# Patient Record
Sex: MALE | Race: WHITE | URBAN - METROPOLITAN AREA
[De-identification: names, ages, dates, MRNs, and addresses within clinical notes are randomized per-mention and may not be internally consistent; named-entity substitution may affect disease eponyms.]

---

## 2017-01-01 ENCOUNTER — TRANSFERRED RECORDS (OUTPATIENT)
Dept: HEALTH INFORMATION MANAGEMENT | Facility: CLINIC | Age: 82
End: 2017-01-01

## 2017-01-01 ENCOUNTER — PRE VISIT (OUTPATIENT)
Dept: RADIATION ONCOLOGY | Facility: CLINIC | Age: 82
End: 2017-01-01

## 2017-01-01 ENCOUNTER — OFFICE VISIT (OUTPATIENT)
Dept: RADIATION ONCOLOGY | Facility: CLINIC | Age: 82
End: 2017-01-01
Attending: RADIOLOGY
Payer: MEDICARE

## 2017-01-01 ENCOUNTER — HOSPITAL ENCOUNTER (OUTPATIENT)
Dept: MEDSURG UNIT | Facility: CLINIC | Age: 82
End: 2017-08-30
Attending: RADIOLOGY
Payer: MEDICARE

## 2017-01-01 ENCOUNTER — HOSPITAL ENCOUNTER (OUTPATIENT)
Dept: MRI IMAGING | Facility: CLINIC | Age: 82
Discharge: HOME | End: 2017-08-30
Attending: NEUROLOGICAL SURGERY | Admitting: NEUROLOGICAL SURGERY
Payer: MEDICARE

## 2017-01-01 ENCOUNTER — TELEPHONE (OUTPATIENT)
Dept: RADIATION ONCOLOGY | Facility: CLINIC | Age: 82
End: 2017-01-01

## 2017-01-01 VITALS
HEART RATE: 101 BPM | TEMPERATURE: 97.7 F | SYSTOLIC BLOOD PRESSURE: 149 MMHG | RESPIRATION RATE: 16 BRPM | DIASTOLIC BLOOD PRESSURE: 83 MMHG | OXYGEN SATURATION: 98 %

## 2017-01-01 VITALS
HEIGHT: 66 IN | BODY MASS INDEX: 26.2 KG/M2 | WEIGHT: 163 LBS | RESPIRATION RATE: 16 BRPM | DIASTOLIC BLOOD PRESSURE: 79 MMHG | OXYGEN SATURATION: 98 % | HEART RATE: 76 BPM | SYSTOLIC BLOOD PRESSURE: 152 MMHG

## 2017-01-01 VITALS
DIASTOLIC BLOOD PRESSURE: 86 MMHG | SYSTOLIC BLOOD PRESSURE: 117 MMHG | RESPIRATION RATE: 16 BRPM | HEART RATE: 96 BPM | OXYGEN SATURATION: 98 %

## 2017-01-01 DIAGNOSIS — C79.31 METASTASIS TO BRAIN (H): Primary | ICD-10-CM

## 2017-01-01 DIAGNOSIS — C79.31 METASTASIS TO BRAIN (H): ICD-10-CM

## 2017-01-01 DIAGNOSIS — C79.49 SECONDARY MALIGNANT NEOPLASM OF BRAIN AND SPINAL CORD (H): Primary | ICD-10-CM

## 2017-01-01 DIAGNOSIS — C79.31 SECONDARY MALIGNANT NEOPLASM OF BRAIN AND SPINAL CORD (H): Primary | ICD-10-CM

## 2017-01-01 LAB
ANION GAP SERPL CALCULATED.3IONS-SCNC: NORMAL MMOL/L
BASOPHILS NFR BLD AUTO: ABNORMAL %
BUN SERPL-MCNC: NORMAL MG/DL
CALCIUM SERPL-MCNC: NORMAL MG/DL
CHLORIDE SERPLBLD-SCNC: NORMAL MMOL/L
CO2 SERPL-SCNC: NORMAL MMOL/L
CREAT SERPL-MCNC: 0.78 MG/DL
EOSINOPHIL NFR BLD AUTO: ABNORMAL %
ERYTHROCYTE [DISTWIDTH] IN BLOOD BY AUTOMATED COUNT: ABNORMAL %
GFR SERPL CREATININE-BSD FRML MDRD: >60 ML/MIN/1.73M2
GLUCOSE SERPL-MCNC: NORMAL MG/DL (ref 70–99)
HCT VFR BLD AUTO: ABNORMAL %
HEMOGLOBIN: 11 G/DL (ref 13.3–17.7)
INR PPP: 1.2
LYMPHOCYTES NFR BLD AUTO: ABNORMAL %
MCH RBC QN AUTO: ABNORMAL PG
MCHC RBC AUTO-ENTMCNC: ABNORMAL G/DL
MCV RBC AUTO: ABNORMAL FL
MONOCYTES NFR BLD AUTO: ABNORMAL %
NEUTROPHILS NFR BLD AUTO: 92.5 %
PLATELET COUNT - QUEST: 200 10^9/L (ref 150–450)
POTASSIUM SERPL-SCNC: 4.6 MMOL/L
RBC # BLD AUTO: ABNORMAL 10^12/L
SODIUM SERPL-SCNC: 136 MMOL/L
WBC # BLD AUTO: 7 10^9/L

## 2017-01-01 PROCEDURE — 77370 RADIATION PHYSICS CONSULT: CPT | Performed by: RADIOLOGY

## 2017-01-01 PROCEDURE — A9270 NON-COVERED ITEM OR SERVICE: HCPCS | Mod: GY | Performed by: NEUROLOGICAL SURGERY

## 2017-01-01 PROCEDURE — 99215 OFFICE O/P EST HI 40 MIN: CPT | Mod: 25 | Performed by: RADIOLOGY

## 2017-01-01 PROCEDURE — 25000128 H RX IP 250 OP 636: Performed by: NEUROLOGICAL SURGERY

## 2017-01-01 PROCEDURE — 25000128 H RX IP 250 OP 636: Mod: ZF | Performed by: NEUROLOGICAL SURGERY

## 2017-01-01 PROCEDURE — 77371 SRS MULTISOURCE: CPT | Performed by: RADIOLOGY

## 2017-01-01 PROCEDURE — 77300 RADIATION THERAPY DOSE PLAN: CPT | Performed by: RADIOLOGY

## 2017-01-01 PROCEDURE — A9585 GADOBUTROL INJECTION: HCPCS | Performed by: NEUROLOGICAL SURGERY

## 2017-01-01 PROCEDURE — 25000131 ZZH RX MED GY IP 250 OP 636 PS 637: Mod: ZF | Performed by: NEUROLOGICAL SURGERY

## 2017-01-01 PROCEDURE — 77295 3-D RADIOTHERAPY PLAN: CPT | Performed by: RADIOLOGY

## 2017-01-01 PROCEDURE — 70552 MRI BRAIN STEM W/DYE: CPT

## 2017-01-01 PROCEDURE — 40000172 ZZH STATISTIC PROCEDURE PREP ONLY

## 2017-01-01 PROCEDURE — 25000132 ZZH RX MED GY IP 250 OP 250 PS 637: Mod: GY | Performed by: NEUROLOGICAL SURGERY

## 2017-01-01 PROCEDURE — 77334 RADIATION TREATMENT AID(S): CPT | Performed by: RADIOLOGY

## 2017-01-01 PROCEDURE — 25000125 ZZHC RX 250: Mod: ZF | Performed by: NEUROLOGICAL SURGERY

## 2017-01-01 RX ORDER — ONDANSETRON 2 MG/ML
4 INJECTION INTRAMUSCULAR; INTRAVENOUS
Status: DISCONTINUED | OUTPATIENT
Start: 2017-01-01 | End: 2017-01-01 | Stop reason: HOSPADM

## 2017-01-01 RX ORDER — LIDOCAINE 40 MG/G
1 CREAM TOPICAL SEE ADMIN INSTRUCTIONS
Status: COMPLETED | OUTPATIENT
Start: 2017-01-01 | End: 2017-01-01

## 2017-01-01 RX ORDER — HYDROCODONE BITARTRATE AND ACETAMINOPHEN 5; 325 MG/1; MG/1
1-2 TABLET ORAL EVERY 6 HOURS PRN
Status: DISCONTINUED | OUTPATIENT
Start: 2017-01-01 | End: 2017-01-01 | Stop reason: HOSPADM

## 2017-01-01 RX ORDER — LORAZEPAM 0.5 MG/1
.5-1 TABLET ORAL
Status: DISCONTINUED | OUTPATIENT
Start: 2017-01-01 | End: 2017-01-01 | Stop reason: HOSPADM

## 2017-01-01 RX ORDER — LORATADINE 10 MG/1
10 TABLET ORAL DAILY
COMMUNITY

## 2017-01-01 RX ORDER — LORAZEPAM 0.5 MG/1
.5-2 TABLET ORAL
Status: COMPLETED | OUTPATIENT
Start: 2017-01-01 | End: 2017-01-01

## 2017-01-01 RX ORDER — DEXAMETHASONE 4 MG/1
4 TABLET ORAL
Status: COMPLETED | OUTPATIENT
Start: 2017-01-01 | End: 2017-01-01

## 2017-01-01 RX ORDER — ACETAMINOPHEN 325 MG/1
650 TABLET ORAL EVERY 4 HOURS PRN
Status: DISCONTINUED | OUTPATIENT
Start: 2017-01-01 | End: 2017-01-01 | Stop reason: HOSPADM

## 2017-01-01 RX ORDER — GADOBUTROL 604.72 MG/ML
7.5 INJECTION INTRAVENOUS ONCE
Status: COMPLETED | OUTPATIENT
Start: 2017-01-01 | End: 2017-01-01

## 2017-01-01 RX ORDER — ONDANSETRON 4 MG/1
8 TABLET, ORALLY DISINTEGRATING ORAL EVERY 6 HOURS PRN
Status: DISCONTINUED | OUTPATIENT
Start: 2017-01-01 | End: 2017-01-01 | Stop reason: HOSPADM

## 2017-01-01 RX ORDER — HEPARIN SODIUM (PORCINE) LOCK FLUSH IV SOLN 100 UNIT/ML 100 UNIT/ML
500 SOLUTION INTRAVENOUS SEE ADMIN INSTRUCTIONS
Status: COMPLETED | OUTPATIENT
Start: 2017-01-01 | End: 2017-01-01

## 2017-01-01 RX ORDER — HEPARIN SODIUM,PORCINE 10 UNIT/ML
5 VIAL (ML) INTRAVENOUS
Status: COMPLETED | OUTPATIENT
Start: 2017-01-01 | End: 2017-01-01

## 2017-01-01 RX ADMIN — DEXAMETHASONE 4 MG: 4 TABLET ORAL at 11:13

## 2017-01-01 RX ADMIN — LORAZEPAM 1 MG: 0.5 TABLET ORAL at 06:20

## 2017-01-01 RX ADMIN — LIDOCAINE 1 G: 40 CREAM TOPICAL at 06:20

## 2017-01-01 RX ADMIN — LIDOCAINE HYDROCHLORIDE,EPINEPHRINE BITARTRATE 30 ML: 20; .01 INJECTION, SOLUTION INFILTRATION; PERINEURAL at 06:19

## 2017-01-01 RX ADMIN — GADOBUTROL 7.5 ML: 604.72 INJECTION INTRAVENOUS at 07:58

## 2017-01-01 RX ADMIN — SODIUM CHLORIDE, PRESERVATIVE FREE 500 UNITS: 5 INJECTION INTRAVENOUS at 11:14

## 2017-01-01 RX ADMIN — SODIUM CHLORIDE, PRESERVATIVE FREE 5 ML: 5 INJECTION INTRAVENOUS at 06:20

## 2017-01-01 RX ADMIN — SODIUM CHLORIDE, PRESERVATIVE FREE 5 ML: 5 INJECTION INTRAVENOUS at 07:50

## 2017-01-01 ASSESSMENT — ENCOUNTER SYMPTOMS
CONSTIPATION: 0
HEADACHES: 1
PHOTOPHOBIA: 0
BLURRED VISION: 1
DOUBLE VISION: 0
NAUSEA: 0
COUGH: 1
DIARRHEA: 1
SPUTUM PRODUCTION: 1
FEVER: 0
DEPRESSION: 0
VOMITING: 0
SHORTNESS OF BREATH: 1
WEIGHT LOSS: 0
MUSCULOSKELETAL NEGATIVE: 1
SEIZURES: 1

## 2017-07-07 ENCOUNTER — HOSPITAL ENCOUNTER (OUTPATIENT)
Age: 82
Discharge: HOME | End: 2017-07-07
Payer: MEDICARE

## 2017-07-07 LAB
INR BLD: 1.7
PROTHROMBIN TIME: 17.5 SEC (ref 9.7–11.6)

## 2017-07-07 PROCEDURE — 36415 COLL VENOUS BLD VENIPUNCTURE: CPT

## 2017-07-07 PROCEDURE — 85610 PROTHROMBIN TIME: CPT

## 2017-08-14 NOTE — TELEPHONE ENCOUNTER
"Date: 2017   Age: 82 year old  Ethnicity:    Sex: male  : 1935   Lives In: [place], [place]      Diagnosis: Esophageal Cancer    Prior radiation therapy:   Site Treated: esophageal tumor and adjacent regional lymph nodes of mediastinum and upper abd  Facility: [facility] by Dr. Bailon  Dates: 16 thru 16  Dose: 5040 cGy in 28 fractions    Site Treated: at  Facility: at  Dates: at  Dose: at        Prior chemotherapy: See Below      Pain at time of consult, management plan if yes:  Does pt have a living will:  Fall Risk, if yes what precautions taking:  Is Pt Pregnant:  Does Pt have any implanted cardiac devices:    Doctors to \"cc\":  Dr. Abimael Bailon, Dr. Irvin Carson    Pt was educated on Gamma Knife Procedure     RN time with patient      Review Since Diagnosis:    Symptoms    6654-3724; started having lower esophageal mucosal biopsies, each showing Hector's esophagus    ; started having dysphagia, poor appetite, generalized weakness and wt loss    16; CT ABd/Pelvis, abd aortic aneurysm, hiatal hernia and two slightly prominent nodes near hiatal hernia    16; biopsy of esophagus, showed moderate poorly differentiated invasive adenocarcinoma     16; abd aortic aneurysm repair     16; celiac lymph node and perigastric lymph node biopsy showed metastatic adenocarcinoma consistent with esophageal primary    16; PET, 10 cm lesion mid to distal esophagus, one metabolically active lymph node upper abdomen, no distant mets     16; pt saw Dr. Bailon, discussed radiation    16 thru 16; chemo of Carboplatin and Paclitaxel by Dr. Carson    16 thru 16; radiation to esophageal tumor and adjacent regional lymph nodes of mediastinum and upper abd    Did not get complete response    17; robotic transhiatal esophagectomy, pull through neck anastomosis     ; did not show relapsed disease     17; to [facility] with nausea and vomiting and diarrhea, changes " in mentation, gets feedings thru j tube     8/9/17; CT Head non contrast, mass effect right frontal and left parietal, recommend MRI     8/9/17; Brain MRI, 2.1 x 2 x 2.3 cm lesion right frontal                                  1.3 x 2 x 1.9 cm lesion left inferior parietal     8/9/17; saw in house Med/Onc CNP, refer to rad/onc    8/11/17; Dr. Bailon saw pt in house,  Notes pt on dexamethasone and symptoms improved, refer to Dr. Salas for GK vs whole brain    Chief Complaint: at consult

## 2017-08-16 NOTE — PROGRESS NOTES
"  HPI    Date: 2017   Age: 82 year old  Ethnicity:    Sex: male  : 1935   Lives In: [place], [place]      Diagnosis: Esophageal Cancer    Prior radiation therapy:   Site Treated: esophageal tumor and adjacent regional lymph nodes of mediastinum and upper abd  Facility: [facility] by Dr. Bailon  Dates: 16 thru 16  Dose: 5040 cGy in 28 fractions        Prior chemotherapy: See Below      Pain at time of consult, management plan if yes: pt denies pain at time of consult  Does pt have a living will:  Pt has a living  Fall Risk, if yes what precautions taking: pt has not been falling  Is Pt Pregnant: N/A  Does Pt have any implanted cardiac devices: pt has no implanted cardiac devices    Doctors to \"cc\":  Dr. Abimael Bailon, Dr. Irvin Carson, Dr. Beto Walter-family doctor    Pt was educated on Gamma Knife Procedure ;yes    RN time with patient 55 minutes    Review Since Diagnosis:    Extremely bad breath    [id number]; started having lower esophageal mucosal biopsies, each showing Hector's esophagus    ; started having dysphagia, poor appetite, generalized weakness and wt loss    16; CT ABd/Pelvis, abd aortic aneurysm, hiatal hernia and two slightly prominent nodes near hiatal hernia    16; biopsy of esophagus, showed moderate poorly differentiated invasive adenocarcinoma     16; abd aortic aneurysm repair     16; celiac lymph node and perigastric lymph node biopsy showed metastatic adenocarcinoma consistent with esophageal primary    16; PET, 10 cm lesion mid to distal esophagus, one metabolically active lymph node upper abdomen, no distant mets     16; pt saw Dr. Bailon, discussed radiation    16 thru 16; chemo of Carboplatin and Paclitaxel by Dr. Carson    16 thru 16; radiation to esophageal tumor and adjacent regional lymph nodes of mediastinum and upper abd    Did not get complete response    17; robotic transhiatal esophagectomy, pull " through neck anastomosis     5/17; did not show relapsed disease     8/9/17; to [facility] with nausea and vomiting and diarrhea, changes in mentation, gets feedings thru j tube     8/9/17; CT Head non contrast, mass effect right frontal and left parietal, recommend MRI     8/9/17; Brain MRI, 2.1 x 2 x 2.3 cm lesion right frontal                                  1.3 x 2 x 1.9 cm lesion left inferior parietal     8/9/17; saw in house Med/Onc CNP, refer to rad/onc    8/11/17; Dr. Bailon saw pt in house,  Notes pt on dexamethasone and symptoms improved, refer to Dr. Salas for GK vs whole brain    Chief Complaint: since dexamethasone mentation has really improved, pt notes it was so bad that he didn't know how to respond to things, things just didn't register, nausea and vomiting improved, pt still feels weak        Review of Systems   Constitutional: Positive for malaise/fatigue. Negative for fever and weight loss.   HENT:        Pt notes rare dull headaches, does not need meds for   Eyes: Positive for blurred vision. Negative for double vision and photophobia.   Respiratory: Positive for cough, sputum production and shortness of breath.    Cardiovascular: Negative for chest pain and leg swelling.   Gastrointestinal: Positive for diarrhea. Negative for constipation, nausea and vomiting.   Genitourinary: Negative.    Musculoskeletal: Negative.    Neurological: Positive for seizures and headaches.   Psychiatric/Behavioral: Negative for depression.

## 2017-08-16 NOTE — MR AVS SNAPSHOT
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)    -  1       Follow-ups after your visit        Your next 10 appointments already scheduled     Aug 30, 2017  6:00 AM CDT   GAMMA TREATMENT with Jeronimo Salas MD   [facility] ([facility]    [address] [address]   [phone]            Aug 30, 2017  8:00 AM CDT   MR BRAIN W CONTRAST with UUMR1   [facility] [place], MRI ([facility])    [address] [address]   [phone]           Take your medicines as usual, unless your doctor tells you not to. Bring a list of your current medicines to your exam (including vitamins, minerals and over-the-counter drugs).  You will be given intravenous contrast for this exam. To prepare:   The day before your exam, drink extra fluids at least six 8-ounce glasses (unless your doctor tells you to restrict your fluids).   Have a blood test (creatinine test) within 30 days of your exam. Go to your clinic or Diagnostic Imaging Department for this test.  The MRI machine uses a strong magnet. Please wear clothes without metal (snaps, zippers). A sweatsuit works well, or we may give you a hospital gown.  Please remove any body piercings and hair extensions before you arrive. You will also remove watches, jewelry, hairpins, wallets, dentures, partial dental plates and hearing aids. You may wear contact lenses, and you may be able to wear your rings. We have a safe place to keep your personal items, but it is safer to leave them at home.   **IMPORTANT** THE INSTRUCTIONS BELOW ARE ONLY FOR THOSE  "PATIENTS WHO HAVE BEEN TOLD THEY WILL RECEIVE SEDATION OR GENERAL ANESTHESIA DURING THEIR MRI PROCEDURE:  IF YOU WILL RECEIVE SEDATION (take medicine to help you relax during your exam):   You must get the medicine from your doctor before you arrive. Bring the medicine to the exam. Do not take it at home.   Arrive one hour early. Bring someone who can take you home after the test. Your medicine will make you sleepy. After the exam, you may not drive, take a bus or take a taxi by yourself.   No eating 8 hours before your exam. You may have clear liquids up until 4 hours before your exam. (Clear liquids include water, clear tea, black coffee and fruit juice without pulp.)  IF YOU WILL RECEIVE ANESTHESIA (be asleep for your exam):   Arrive 1 1/2 hours early. Bring someone who can take you home after the test. You may not drive, take a bus or take a taxi by yourself.   No eating 8 hours before your exam. You may have clear liquids up until 4 hours before your exam. (Clear liquids include water, clear tea, black coffee and fruit juice without pulp.)  Please call the Imaging Department at your exam site with any questions.              Who to contact     Please call your clinic at [phone] to:    Ask questions about your health    Make or cancel appointments    Discuss your medicines    Learn about your test results    Speak to your doctor   If you have compliments or concerns about an experience at your clinic, or if you wish to file a complaint, please contact [facility] Patient Relations at [phone] or email us at [email or web address]         Additional Information About Your Visit        Care EveryWhere ID     This is your Care EveryWhere ID. This could be used by other organizations to access your [facility] medical records  [id number]        Your Vitals Were     Pulse Respirations Height Pulse Oximetry BMI (Body Mass Index)       76 16 1.676 m (5' 6\") 98% 26.31 kg/m2     "    Blood Pressure from Last 3 Encounters:   08/16/17 152/79    Weight from Last 3 Encounters:   08/16/17 73.9 kg (163 lb)              Today, you had the following     No orders found for display       Primary Care Provider Office Phone # Fax #    Beto Walter [phone]       [facility] [address]   [facility] [address]        Equal Access to Services     JONNY [facility] Center[facility]ON : Hadii aad ku hadasho Soomaali, waaxda luqadaha, qaybta kaalmada adeegyada, waxay idiin hayaan adeeg kharash la'aan . So [facility] [phone].    ATENCIÓN: Si habla español, tiene a carias disposición servicios gratuitos de asistencia lingüística. Llame al [phone].    We comply with applicable federal civil rights laws and [place] laws. We do not discriminate on the basis of race, color, national origin, age, disability sex, sexual orientation or gender identity.            Thank you!     Thank you for choosing [facility] [place], RADIATION ONCOLOGY  for your care. Our goal is always to provide you with excellent care. Hearing back from our patients is one way we can continue to improve our services. Please take a few minutes to complete the written survey that you may receive in the mail after your visit with us. Thank you!             Your Updated Medication List - Protect others around you: Learn how to safely use, store and throw away your medicines at www.disposemymeds.org.          This list is accurate as of: 8/16/17 11:59 PM.  Always use your most recent med list.                   Brand Name Dispense Instructions for use Diagnosis    ALLOPURINOL PO      Take 300 mg by mouth daily        AMLODIPINE BESYLATE PO      Take 5 mg by mouth daily        ATORVASTATIN CALCIUM PO      Take 20 mg by mouth daily        DEXAMETHASONE PO      Take 6 mg by mouth 3 times daily (with meals)        loratadine 10 MG tablet    CLARITIN     Take 10 mg by mouth daily        METOPROLOL TARTRATE PO      Take 150 mg by  mouth 2 times daily        OMEPRAZOLE PO      Take 40 mg by mouth every morning        WARFARIN SODIUM PO      Take 5 mg by mouth daily 2.5 mg on Friday, 5 mg every other day

## 2017-08-16 NOTE — LETTER
"2017     RE: Adi Curry  [address] [address]     Dear Colleague,    Thank you for referring your patient, Adi Curry, to the [facility] [place], RADIATION ONCOLOGY. Please see a copy of my visit note below.    HPI    Date: 2017   Age: 82 year old  Ethnicity:    Sex: male  : 1935   Lives In: [place], [place]      Diagnosis: Esophageal Cancer    Prior radiation therapy:   Site Treated: esophageal tumor and adjacent regional lymph nodes of mediastinum and upper abd  Facility: [facility] by Dr. Bailon  Dates: 16 thru 16  Dose: 5040 cGy in 28 fractions        Prior chemotherapy: See Below      Pain at time of consult, management plan if yes: pt denies pain at time of consult  Does pt have a living will:  Pt has a living  Fall Risk, if yes what precautions taking: pt has not been falling  Is Pt Pregnant: N/A  Does Pt have any implanted cardiac devices: pt has no implanted cardiac devices    Doctors to \"cc\":  Dr. Abimael Bailon, Dr. Irvin Carson, Dr. Beto Walter-family doctor    Pt was educated on Gamma Knife Procedure ;yes    RN time with patient 55 minutes    Review Since Diagnosis:    Extremely bad breath    7015-8841; started having lower esophageal mucosal biopsies, each showing Hector's esophagus    ; started having dysphagia, poor appetite, generalized weakness and wt loss    16; CT ABd/Pelvis, abd aortic aneurysm, hiatal hernia and two slightly prominent nodes near hiatal hernia    16; biopsy of esophagus, showed moderate poorly differentiated invasive adenocarcinoma     16; abd aortic aneurysm repair     16; celiac lymph node and perigastric lymph node biopsy showed metastatic adenocarcinoma consistent with esophageal primary    16; PET, 10 cm lesion mid to distal esophagus, one metabolically active lymph node upper abdomen, no distant mets     16; pt saw Dr. Bailon, discussed radiation    16 thru 16; chemo of " Carboplatin and Paclitaxel by Dr. Carson    8/22/16 thru 9/29/16; radiation to esophageal tumor and adjacent regional lymph nodes of mediastinum and upper abd    Did not get complete response    1/18/17; robotic transhiatal esophagectomy, pull through neck anastomosis     5/17; did not show relapsed disease     8/9/17; to [facility] with nausea and vomiting and diarrhea, changes in mentation, gets feedings thru j tube     8/9/17; CT Head non contrast, mass effect right frontal and left parietal, recommend MRI     8/9/17; Brain MRI, 2.1 x 2 x 2.3 cm lesion right frontal                                  1.3 x 2 x 1.9 cm lesion left inferior parietal     8/9/17; saw in house Med/Onc CNP, refer to rad/onc    8/11/17; Dr. Bailon saw pt in house,  Notes pt on dexamethasone and symptoms improved, refer to Dr. Salas for GK vs whole brain    Chief Complaint: since dexamethasone mentation has really improved, pt notes it was so bad that he didn't know how to respond to things, things just didn't register, nausea and vomiting improved, pt still feels weak        Review of Systems   Constitutional: Positive for malaise/fatigue. Negative for fever and weight loss.   HENT:        Pt notes rare dull headaches, does not need meds for   Eyes: Positive for blurred vision. Negative for double vision and photophobia.   Respiratory: Positive for cough, sputum production and shortness of breath.    Cardiovascular: Negative for chest pain and leg swelling.   Gastrointestinal: Positive for diarrhea. Negative for constipation, nausea and vomiting.   Genitourinary: Negative.    Musculoskeletal: Negative.    Neurological: Positive for seizures and headaches.   Psychiatric/Behavioral: Negative for depression.                 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      HISTORY OF PRESENT ILLNESS:  The patient is an 82-year-old white male, nonsmoker, nondrinker who was in stable health until 2007 when he  "developed gastroesophageal reflux which was attributed to a hiatal hernia.  He was treated with H2 blockers.  He underwent multiple biopsies of the lower esophagus every 1-3 years, which were consistent with Hector's esophagus.  In 12/2015, he developed extremely \"bad breath.\"  He was treated with Pepcid without benefit.  He was evaluated by his dentist who found no cause for the bed breath.  Endoscopy was recommended.  By early 2016, he developed dysphasia, lack of appetite, weight loss and generalized weakness.  CT scans 06/13/2016 revealed an abdominal aortic aneurysm, a hiatal hernia and a lower esophageal mass with paraesophageal adenopathy.  On 06/27/2016, biopsy of the esophageal mass confirmed moderately to poorly differentiated invasive adenocarcinoma.  On 07/13/2016, he underwent repair of his abdominal aortic aneurysm.  On 07/26/2016, biopsy of the celiac adenopathy confirmed metastatic adenocarcinoma.  PET scan 08/02/2016 showed a 10 cm lesion in the mid to distal esophagus with hypermetabolic adenopathy in the abdomen.  The patient underwent concurrent radiation (50.4 Gy completed 09/29/2016) and chemotherapy (carboplatin and Taxol).  CT scan in November showed improvement, but not a complete response.  He had persistent difficulty swallowing.  On 11/25/2016, a feeding tube was placed.  On 01/18/2017, he underwent a robotic transhiatal esophagectomy.  Pathology revealed persistent adenocarcinoma.  CT scan in 05/2017 was stable.  On 07/28/2017, he developed confusion, nausea, vomiting and word-finding difficulty.  On 08/09/2017, head CT and subsequent brain MRI revealed 2 brain metastases (2.3 cm right frontal and 2 cm left inferior parietal) with significant surrounding vasogenic edema.  He was started on dexamethasone and his symptoms improved.  He was referred for consideration of his treatment options.  His chief complaints now are difficulty with short-term memory and a persistent visual field " deficit (right upper outer quadrant) from a stroke on 04/10/2014.  He denies headaches or word finding difficulty at this time.      PAST MEDICAL HISTORY:   1.  Status post cerebrovascular accident 04/10/2014.   2.  Acute renal failure.   3.  Atrial fibrillation.   4.  Gastroesophageal reflux disease.   5.  Gout.   6.  Hypertension.   7.  Hypercholesterolemia.   8.  Prostate cancer, status post prostatectomy in 2001.   9.  History of diabetes.   10.  Status post AAA repair.   11.  Status post coronary artery bypass graft surgery.   12.  Status post carotid endarterectomy.   13.  Status post multiple tooth extractions.      MEDICATIONS:   1.  Allopurinol.    2.  Amlodipine.    3.  Atorvastatin.   4.  Dexamethasone 6 mg p.o. t.i.d. (this will be tapered to 4 mg p.o. t.i.d. today).   5.  Claritin.   6.  Metoprolol.   7.  Omeprazole.   8.  Coumadin 5 mg daily, except 2.5 mg on Fridays.      ALLERGIES:     1.  Lisinopril.    2.  Niacin.   3.  Tape.      REVIEW OF SYSTEMS:  All systems were reviewed and found to be otherwise negative.      SOCIAL HISTORY:  The patient is  and has 4 children.  He lives in [place] with his wife.  He is accompanied to today's appointment by his wife and their family friend.  He has been employed as a .      HABITS:  The patient has never smoked tobacco and does not drink alcohol.      FAMILY HISTORY:  There is no family history for malignancy.  There is a family history for hypertension, heart disease and alcoholism.      PHYSICAL EXAMINATION:   GENERAL:  Elderly white male in no acute distress, alert and oriented, pleasant and cooperative.   VITAL SIGNS:  Afebrile, vital signs stable.  Height 5 feet 6 inches, weight 163, blood pressure 152/79, heart rate 76, respirations 12, oxygen saturation on room air 98%.  BMI 26.3.   HEENT:  Shows the right upper peripheral visual field loss.   NECK:  Supple without adenopathy.   LUNGS:  Clear to auscultation.    HEART:  Regular rate and rhythm.   ABDOMEN:  Soft, nontender.   EXTREMITIES:  Without cyanosis, clubbing or edema.   NEUROLOGIC:  Reveals a mental status that is alert and oriented, pleasant and cooperative.  He does have some difficulty with short-term memory as he can recall 1 of 3 objects at 5 minutes.  Cranial nerves are intact except for the aforementioned visual field deficit.  Motor exam reveals generalized weakness but no focal weakness.  Cerebellar testing shows he ambulates with a cane with a slow shuffling gait.      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      RECOMMENDATIONS:  The diagnosis, prognosis and therapeutic options were reviewed with the patient, his wife and their friend.  The brain MRI images were reviewed with them and the tumors were pointed out, as was the edema.  I do not recommend whole brain radiation therapy.  I do recommend gamma knife radiosurgery for the brain metastases.  The rationale for this approach as well as the procedures, risks, benefits and potential side effects were explained.  The patient, his wife and their friend appear to understand and agree to proceed.  The procedure is scheduled for 2017 at the [facility] at the [facility].      Thank you very much for asking me to see this pleasant gentleman and to share in his evaluation.          PATRICIA MELGAR MD      cc:   Irvin Carson MD   [facility]    [address] [place] [address], [address]   [place] [id number]      Beto Walter MD   [facility]   [address]   [place]  [address]      Abimael Bailon MD   [facility]   [address] [place] [address]       Tumor Registry        D: 2017 17:48   T: 2017 22:19   MT: mary ann    Name:     SEVERO CASILLAS   MRN:      [id number]        Account:      [id number]   :      1935           Service Date: 2017     Document: [id number]

## 2017-08-17 NOTE — PROGRESS NOTES
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      HISTORY OF PRESENT ILLNESS:  The patient is an 82-year-old white male, nonsmoker, nondrinker who was in stable health until 2007 when he developed gastroesophageal reflux which was attributed to a hiatal hernia.  He was treated with H2 blockers.  He underwent multiple biopsies of the lower esophagus every 1-3 years, which were consistent with Hector's esophagus.  In 12/2015, he developed extremely \"bad breath.\"  He was treated with Pepcid without benefit.  He was evaluated by his dentist who found no cause for the bed breath.  Endoscopy was recommended.  By early 2016, he developed dysphasia, lack of appetite, weight loss and generalized weakness.  CT scans 06/13/2016 revealed an abdominal aortic aneurysm, a hiatal hernia and a lower esophageal mass with paraesophageal adenopathy.  On 06/27/2016, biopsy of the esophageal mass confirmed moderately to poorly differentiated invasive adenocarcinoma.  On 07/13/2016, he underwent repair of his abdominal aortic aneurysm.  On 07/26/2016, biopsy of the celiac adenopathy confirmed metastatic adenocarcinoma.  PET scan 08/02/2016 showed a 10 cm lesion in the mid to distal esophagus with hypermetabolic adenopathy in the abdomen.  The patient underwent concurrent radiation (50.4 Gy completed 09/29/2016) and chemotherapy (carboplatin and Taxol).  CT scan in November showed improvement, but not a complete response.  He had persistent difficulty swallowing.  On 11/25/2016, a feeding tube was placed.  On 01/18/2017, he underwent a robotic transhiatal esophagectomy.  Pathology revealed persistent adenocarcinoma.  CT scan in 05/2017 was stable.  On 07/28/2017, he developed confusion, nausea, vomiting and word-finding difficulty.  On 08/09/2017, head CT and subsequent brain MRI revealed 2 brain metastases (2.3 cm right frontal and 2 cm left inferior parietal) with significant surrounding vasogenic " edema.  He was started on dexamethasone and his symptoms improved.  He was referred for consideration of his treatment options.  His chief complaints now are difficulty with short-term memory and a persistent visual field deficit (right upper outer quadrant) from a stroke on 04/10/2014.  He denies headaches or word finding difficulty at this time.      PAST MEDICAL HISTORY:   1.  Status post cerebrovascular accident 04/10/2014.   2.  Acute renal failure.   3.  Atrial fibrillation.   4.  Gastroesophageal reflux disease.   5.  Gout.   6.  Hypertension.   7.  Hypercholesterolemia.   8.  Prostate cancer, status post prostatectomy in 2001.   9.  History of diabetes.   10.  Status post AAA repair.   11.  Status post coronary artery bypass graft surgery.   12.  Status post carotid endarterectomy.   13.  Status post multiple tooth extractions.      MEDICATIONS:   1.  Allopurinol.    2.  Amlodipine.    3.  Atorvastatin.   4.  Dexamethasone 6 mg p.o. t.i.d. (this will be tapered to 4 mg p.o. t.i.d. today).   5.  Claritin.   6.  Metoprolol.   7.  Omeprazole.   8.  Coumadin 5 mg daily, except 2.5 mg on Fridays.      ALLERGIES:     1.  Lisinopril.    2.  Niacin.   3.  Tape.      REVIEW OF SYSTEMS:  All systems were reviewed and found to be otherwise negative.      SOCIAL HISTORY:  The patient is  and has 4 children.  He lives in [place] with his wife.  He is accompanied to today's appointment by his wife and their family friend.  He has been employed as a .      HABITS:  The patient has never smoked tobacco and does not drink alcohol.      FAMILY HISTORY:  There is no family history for malignancy.  There is a family history for hypertension, heart disease and alcoholism.      PHYSICAL EXAMINATION:   GENERAL:  Elderly white male in no acute distress, alert and oriented, pleasant and cooperative.   VITAL SIGNS:  Afebrile, vital signs stable.  Height 5 feet 6 inches, weight 163, blood pressure  152/79, heart rate 76, respirations 12, oxygen saturation on room air 98%.  BMI 26.3.   HEENT:  Shows the right upper peripheral visual field loss.   NECK:  Supple without adenopathy.   LUNGS:  Clear to auscultation.   HEART:  Regular rate and rhythm.   ABDOMEN:  Soft, nontender.   EXTREMITIES:  Without cyanosis, clubbing or edema.   NEUROLOGIC:  Reveals a mental status that is alert and oriented, pleasant and cooperative.  He does have some difficulty with short-term memory as he can recall 1 of 3 objects at 5 minutes.  Cranial nerves are intact except for the aforementioned visual field deficit.  Motor exam reveals generalized weakness but no focal weakness.  Cerebellar testing shows he ambulates with a cane with a slow shuffling gait.      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      RECOMMENDATIONS:  The diagnosis, prognosis and therapeutic options were reviewed with the patient, his wife and their friend.  The brain MRI images were reviewed with them and the tumors were pointed out, as was the edema.  I do not recommend whole brain radiation therapy.  I do recommend gamma knife radiosurgery for the brain metastases.  The rationale for this approach as well as the procedures, risks, benefits and potential side effects were explained.  The patient, his wife and their friend appear to understand and agree to proceed.  The procedure is scheduled for 08/30/2017 at the Gamma Knife Center at the [facility].      Thank you very much for asking me to see this pleasant gentleman and to share in his evaluation.       cc:   Irvin Carson MD   [facility]    [address]   [place]  [address]      Beto Walter MD   [facility]   [address]   [place]  [address]      Abimael Bailon MD   [facility]   [address] [id number]       Tumor Registry         PATRICIA FLANNERY  MD RAMÓN             D: 2017 17:48   T: 2017 22:19   MT: mary ann      Name:     SEVERO CASILLAS   MRN:      [id number]        Account:      [id number]   :      1935           Service Date: 2017      Document: [id number]

## 2017-08-30 NOTE — MR AVS SNAPSHOT
After Visit Summary   8/30/2017    Adi Curry    MRN: [id number]           Patient Information     Date Of Birth          1935        Visit Information        Provider Department      8/30/2017 6:00 AM Jeronimo Salas MD [facility], [place], Radiation Oncology        Today's Diagnoses     Metastasis to brain (H)    -  1       Follow-ups after your visit        Future tests that were ordered for you today     Open Standing Orders        Priority Remaining Interval Expires Ordered    Oxygen: Nasal cannula Routine 33158/63852 PRN  8/30/2017    If Patient Diabetic: Glucose monitor nursing POCT Routine [id number] PRN 8/31/2017 8/30/2017            Who to contact     Please call your clinic at [phone] to:    Ask questions about your health    Make or cancel appointments    Discuss your medicines    Learn about your test results    Speak to your doctor   If you have compliments or concerns about an experience at your clinic, or if you wish to file a complaint, please contact [facility] Patient Relations at [phone] or email us at [email or web address]         Additional Information About Your Visit        Care EveryWhere ID     This is your Care EveryWhere ID. This could be used by other organizations to access your [place] medical records  [id number]        Your Vitals Were     Pulse Respirations Pulse Oximetry             80 16 98%          Blood Pressure from Last 3 Encounters:   08/30/17 149/83   08/30/17 133/84   08/16/17 152/79    Weight from Last 3 Encounters:   08/16/17 73.9 kg (163 lb)              Today, you had the following     No orders found for display       Primary Care Provider Office Phone # Fax #    Beto Walter [phone]       [facility] [address]   [facility] [address]        Equal Access to Services     INEZ HAY AH: adrienne Andre qaybta  kenroy zapiencris gaines ah. So [facility] [phone].    ATENCIÓN: Si mike merino, tiene a carias disposición servicios gratuitos de asistencia lingüística. Jessica al [phone].    We comply with applicable federal civil rights laws and [place] laws. We do not discriminate on the basis of race, color, national origin, age, disability sex, sexual orientation or gender identity.            Thank you!     Thank you for choosing [facility], [place], RADIATION ONCOLOGY  for your care. Our goal is always to provide you with excellent care. Hearing back from our patients is one way we can continue to improve our services. Please take a few minutes to complete the written survey that you may receive in the mail after your visit with us. Thank you!             Your Updated Medication List - Protect others around you: Learn how to safely use, store and throw away your medicines at www.disposemymeds.org.          This list is accurate as of: 8/30/17 11:22 AM.  Always use your most recent med list.                   Brand Name Dispense Instructions for use Diagnosis    ALLOPURINOL PO      Take 300 mg by mouth daily        AMLODIPINE BESYLATE PO      Take 5 mg by mouth daily        ATORVASTATIN CALCIUM PO      Take 20 mg by mouth daily        DEXAMETHASONE PO      Take 4 mg by mouth 3 times daily (with meals)        loratadine 10 MG tablet    CLARITIN     Take 10 mg by mouth daily        METOPROLOL TARTRATE PO      Take 150 mg by mouth 2 times daily        OMEPRAZOLE PO      Take 40 mg by mouth every morning        WARFARIN SODIUM PO      Take 5 mg by mouth daily 2.5 mg on Friday, 5 mg every other day

## 2017-08-30 NOTE — PATIENT INSTRUCTIONS
DECADRON SCHEDULE    Patient Name: Adi Curry    Decadron (Dexamethasone) is a drug, a type of steroid, which is often used in patients with brain tumors to reduce swelling in the brain.  It must be taken carefully and cannot be stopped abruptly.  The dose must be gradually reduced or tapered.  The table below provides exact instructions regarding how to take it.      Day Date Total Daily Dose (mg) Number of 2 mg tablets to take at      Breakfast Lunch Supper Bedtime   W-Sun 8/30-9/10 12 2 2 2 0   M-Sun x2 9/11-9/24 10 2 1 2 0   M-Sun x2 9/25-10/8 8 2 0 2 0   M-Sun x2 10/9-10/22 6 2 0 1 0   M-Sun x2 10/23-11/5 4 1 0 1 0   M-Sun x2 11/6-11/19 2 1 0 0 0   Monday 11/20/17 0 STOP                                                                       Other Instructions: Gamma knife 8/30/17,  Brain MRI in 2 months (late October),  Dexamethasone as above,  See Dr Carson after MRI.      Dr. MELGAR, PATRICIA DUMONT

## 2017-08-30 NOTE — PROGRESS NOTES
PREOPERATIVE DIAGNOSIS: Metastatic esophageal cancer with 2 brain metastases    POSTOPERATIVE DIAGNOSIS: same    OPERATIVE PROCEDURES:   1. Application of stereotactic head frame for stereotactic radiosurgery.   2. Gamma knife radiosurgery to 2 simple metastatic lesions.     SURGEON: Joe Elizalde MD.     ANESTHESIA: Local.     INDICATION FOR THE PROCEDURE: This patient has esophageal cancer with 2 brain metastases. Gamma knife radiosurgery was recommended for both lesions.    DESCRIPTION OF PROCEDURE: On the morning of the procedure, the patient was brought to the gamma knife radiosurgery area. A pre-procedure pause was performed to confirm the identity and date of birth of the patient, as well as the procedure site. The scalp was prepped with Betadine and then anesthetized with a combination of Marcaine, lidocaine  and epinephrine. The Sparktrend G-frame was applied and the pins were fixed to hand tightness. The patient tolerated the application of the frame well. A depth helmet was placed and pin and post measurements were taken.   The patient was taken to the MRI scanner where an MRI with gadolinium contrast was obtained. The patient returned from MRI and all measurements were checked to make sure that the frame had not moved. The lesions were outlined on the planning software and we made a conformal dose outline for each of these lesions. Dr. Salas selected the radiation dose for each lesion.  Measurements were taken,  steps performed and the patient  was then brought into the treatment room. Radiation was given according to the prescription above. The patient was taken out of the unit and out of the treatment room. The stereotactic frame was removed and a dressing was applied. After observation, the patient was discharged. Followup arrangements will be made for the patient.     I attest that I was present for the entirety of the case, including pre-procedure assessment, stereotactic head  frame placement, treatment planning, treatment delivery, as well as frame removal at the end of the treatment.      Joe Elizalde MD, PhD  Neurosurgery

## 2017-08-30 NOTE — LETTER
"2017       RE: Adi Curry  [address] [place] [address]     Dear Colleague,    Thank you for referring your patient, Adi Curry, to the [facility], [place], RADIATION ONCOLOGY. Please see a copy of my visit note below.    GAMMA KNIFE RADIOSURGERY TREATMENT SUMMARY  DEPARTMENT OF RADIATION ONCOLOGY    Name: Adi Curry                                        : 1935                 Medical Record #: [id number]                       Diagnosis: Adenocarcinoma of the Esophagus                                  Date of Treatment: 2017                                       Referring Physicians:Irvin Carson, Beto Walter, Abimael Bailon, Tumor Registry     BRIEF CLINICAL HISTORY:                                                                                                 The patient is an 82-year-old white male, nonsmoker, nondrinker who was in stable health until  when he developed gastroesophageal reflux which was attributed to a hiatal hernia.  He was treated with H2 blockers.  He underwent multiple biopsies of the lower esophagus every 1-3 years, which were consistent with Hector's esophagus.  In 2015, he developed extremely \"bad breath.\"  He was treated with Pepcid without benefit.  He was evaluated by his dentist who found no cause for the bed breath.  Endoscopy was recommended.  By early , he developed dysphasia, lack of appetite, weight loss and generalized weakness.  CT scans 2016 revealed an abdominal aortic aneurysm, a hiatal hernia and a lower esophageal mass with paraesophageal adenopathy.  On 2016, biopsy of the esophageal mass confirmed moderately to poorly differentiated invasive adenocarcinoma.  On 2016, he underwent repair of his abdominal aortic aneurysm.  On 2016, biopsy of the celiac adenopathy confirmed metastatic adenocarcinoma.  PET scan 2016 showed a 10 cm lesion in the mid to distal esophagus with hypermetabolic " adenopathy in the abdomen.  The patient underwent concurrent radiation (50.4 Gy completed 09/29/2016) and chemotherapy (carboplatin and Taxol).  CT scan in November showed improvement, but not a complete response.  He had persistent difficulty swallowing.  On 11/25/2016, a feeding tube was placed.  On 01/18/2017, he underwent a robotic transhiatal esophagectomy.  Pathology revealed persistent adenocarcinoma.  CT scan in 05/2017 was stable.  On 07/28/2017, he developed confusion, nausea, vomiting and word-finding difficulty.  On 08/09/2017, head CT and subsequent brain MRI revealed 2 brain metastases (2.3 cm right frontal and 2 cm left inferior parietal) with significant surrounding vasogenic edema.  He was started on dexamethasone and his symptoms improved.  He was referred for consideration of his treatment options.  His chief complaints now are difficulty with short-term memory and a persistent visual field deficit (right upper outer quadrant) from a stroke on 04/10/2014.  He denies headaches or word finding difficulty at this time.   TECHNICAL SUMMARY        Collimators    Lesion Number Site Energy Minimum Tumor Dose (Gy) Isodose Line (%) Numbers Size (mm) Treatment Volume (cc)   1 Right Frontal  [place] 60 18 50 9 14 12.6   2 Left Inf Parietal [place] 60 18 50 3 14 4.4     DESCRIPTION OF PROCEDURE:                                                                              On 8/30/2017 the patient was brought to the Gamma Knife suite at [facility].  After sedation and topical anesthetic, the head frame was put on by Dr. Yaneli Elizalde.  The patient was then taken to the department of Radiology where a stereotactic brain MRI was performed.  The patient was admitted to the [facility] where he rested comfortably while treatment planning was completed.  The Vit[employer]ol Gamma Plan software was used to create a highly conformal dose distribution using the number and size collimators  detailed above.  The patient was brought to the Gamma Knife suite.  The treatment was delivered using the Model C Leksell Gamma Knife without complication.  The head frame was removed and the patient was discharged home in stable condition.  FOLLOW-UP PLANS:                                                                                                        The Gamma Knife Nurse Coordinator will call the patient tomorrow for short-term follow up.  He should have a brain MRI in 2 months and every 3 months thereafter.  The patient will also follow-up with Dr Yamileth (s).  I would be happy to see him anytime.    Jeronimo Salas MD      PREOPERATIVE DIAGNOSIS: Metastatic esophageal cancer with 2 brain metastases    POSTOPERATIVE DIAGNOSIS: same    OPERATIVE PROCEDURES:   1. Application of stereotactic head frame for stereotactic radiosurgery.   2. Gamma knife radiosurgery to 2 simple metastatic lesions.     SURGEON: Joe Elizalde MD.     ANESTHESIA: Local.     INDICATION FOR THE PROCEDURE: This patient has esophageal cancer with 2 brain metastases. Gamma knife radiosurgery was recommended for both lesions.    DESCRIPTION OF PROCEDURE: On the morning of the procedure, the patient was brought to the gamma knife radiosurgery area. A pre-procedure pause was performed to confirm the identity and date of birth of the patient, as well as the procedure site. The scalp was prepped with Betadine and then anesthetized with a combination of Marcaine, lidocaine  and epinephrine. The Leksell G-frame was applied and the pins were fixed to hand tightness. The patient tolerated the application of the frame well. A depth helmet was placed and pin and post measurements were taken.   The patient was taken to the MRI scanner where an MRI with gadolinium contrast was obtained. The patient returned from MRI and all measurements were checked to make sure that the frame had not moved. The lesions were outlined on the planning software  and we made a conformal dose outline for each of these lesions. Dr. Salas selected the radiation dose for each lesion.  Measurements were taken,  steps performed and the patient  was then brought into the treatment room. Radiation was given according to the prescription above. The patient was taken out of the unit and out of the treatment room. The stereotactic frame was removed and a dressing was applied. After observation, the patient was discharged. Followup arrangements will be made for the patient.     I attest that I was present for the entirety of the case, including pre-procedure assessment, stereotactic head frame placement, treatment planning, treatment delivery, as well as frame removal at the end of the treatment.      Joe Elizalde MD, PhD  Neurosurgery      Again, thank you for allowing me to participate in the care of your patient.      Sincerely,    Jeronimo Salas MD

## 2017-08-30 NOTE — PROGRESS NOTES
"GAMMA KNIFE RADIOSURGERY TREATMENT SUMMARY  DEPARTMENT OF RADIATION ONCOLOGY    Name: Adi Curry                                        : 1935                 Medical Record #: [id number]                       Diagnosis: Adenocarcinoma of the Esophagus                                  Date of Treatment: 2017                                       Referring Physicians:Irvin Carson, Beto Walter, Abimael Bailon, Tumor Registry     BRIEF CLINICAL HISTORY:                                                                                                 The patient is an 82-year-old white male, nonsmoker, nondrinker who was in stable health until  when he developed gastroesophageal reflux which was attributed to a hiatal hernia.  He was treated with H2 blockers.  He underwent multiple biopsies of the lower esophagus every 1-3 years, which were consistent with Hector's esophagus.  In 2015, he developed extremely \"bad breath.\"  He was treated with Pepcid without benefit.  He was evaluated by his dentist who found no cause for the bed breath.  Endoscopy was recommended.  By early , he developed dysphasia, lack of appetite, weight loss and generalized weakness.  CT scans 2016 revealed an abdominal aortic aneurysm, a hiatal hernia and a lower esophageal mass with paraesophageal adenopathy.  On 2016, biopsy of the esophageal mass confirmed moderately to poorly differentiated invasive adenocarcinoma.  On 2016, he underwent repair of his abdominal aortic aneurysm.  On 2016, biopsy of the celiac adenopathy confirmed metastatic adenocarcinoma.  PET scan 2016 showed a 10 cm lesion in the mid to distal esophagus with hypermetabolic adenopathy in the abdomen.  The patient underwent concurrent radiation (50.4 Gy completed 2016) and chemotherapy (carboplatin and Taxol).  CT scan in November showed improvement, but not a complete response.  He had persistent difficulty " swallowing.  On 11/25/2016, a feeding tube was placed.  On 01/18/2017, he underwent a robotic transhiatal esophagectomy.  Pathology revealed persistent adenocarcinoma.  CT scan in 05/2017 was stable.  On 07/28/2017, he developed confusion, nausea, vomiting and word-finding difficulty.  On 08/09/2017, head CT and subsequent brain MRI revealed 2 brain metastases (2.3 cm right frontal and 2 cm left inferior parietal) with significant surrounding vasogenic edema.  He was started on dexamethasone and his symptoms improved.  He was referred for consideration of his treatment options.  His chief complaints now are difficulty with short-term memory and a persistent visual field deficit (right upper outer quadrant) from a stroke on 04/10/2014.  He denies headaches or word finding difficulty at this time.   TECHNICAL SUMMARY        Collimators    Lesion Number Site Energy Minimum Tumor Dose (Gy) Isodose Line (%) Numbers Size (mm) Treatment Volume (cc)   1 Right Frontal  [place] 60 18 50 9 14 12.6   2 Left Inf Parietal [place] 60 18 50 3 14 4.4     DESCRIPTION OF PROCEDURE:                                                                              On 8/30/2017 the patient was brought to the Gamma Knife suite at [facility].  After sedation and topical anesthetic, the head frame was put on by Dr. Yaneli Elizalde.  The patient was then taken to the department of Radiology where a stereotactic brain MRI was performed.  The patient was admitted to the [facility] where he rested comfortably while treatment planning was completed.  The Leksell Gamma Plan software was used to create a highly conformal dose distribution using the number and size collimators detailed above.  The patient was brought to the Gamma Knife suite.  The treatment was delivered using the Model C Leksell Gamma Knife without complication.  The head frame was removed and the patient was discharged home in stable  condition.  FOLLOW-UP PLANS:                                                                                                        The Gamma Knife Nurse Coordinator will call the patient tomorrow for short-term follow up.  He should have a brain MRI in 2 months and every 3 months thereafter.  The patient will also follow-up with Dr Yamileth (s).  I would be happy to see him anytime.    Jeronimo Salas MD

## 2017-08-31 NOTE — TELEPHONE ENCOUNTER
A call was placed to Treasure in follow up to his Gamma Knife treatment on 8/30/17.  I spoke to Nicolette, Treasure's wife, who stated she thought everything was going well.  Nicolette said the head wrap is off, the pin sites all look like they are healing nicely, one in the back was a bit bloody but looks fine.  Nicolette said Treasure has not complained of a headache, he is eating, his spirits are good, he is a bit tired out today but otherwise seems to be doing really well.

## (undated) RX ORDER — HEPARIN SODIUM,PORCINE 10 UNIT/ML
VIAL (ML) INTRAVENOUS
Status: DISPENSED
Start: 2017-01-01

## (undated) RX ORDER — LORAZEPAM 0.5 MG/1
TABLET ORAL
Status: DISPENSED
Start: 2017-01-01

## (undated) RX ORDER — DEXAMETHASONE 4 MG/1
TABLET ORAL
Status: DISPENSED
Start: 2017-01-01

## (undated) RX ORDER — LIDOCAINE 40 MG/G
CREAM TOPICAL
Status: DISPENSED
Start: 2017-01-01

## (undated) RX ORDER — HEPARIN SODIUM (PORCINE) LOCK FLUSH IV SOLN 100 UNIT/ML 100 UNIT/ML
SOLUTION INTRAVENOUS
Status: DISPENSED
Start: 2017-01-01